# Patient Record
Sex: MALE | Race: WHITE
[De-identification: names, ages, dates, MRNs, and addresses within clinical notes are randomized per-mention and may not be internally consistent; named-entity substitution may affect disease eponyms.]

---

## 2020-03-04 ENCOUNTER — HOSPITAL ENCOUNTER (OUTPATIENT)
Dept: HOSPITAL 61 - ENDOS | Age: 60
End: 2020-03-04
Attending: INTERNAL MEDICINE
Payer: COMMERCIAL

## 2020-03-04 VITALS — DIASTOLIC BLOOD PRESSURE: 84 MMHG | SYSTOLIC BLOOD PRESSURE: 155 MMHG

## 2020-03-04 DIAGNOSIS — E78.5: ICD-10-CM

## 2020-03-04 DIAGNOSIS — D12.5: ICD-10-CM

## 2020-03-04 DIAGNOSIS — Z72.89: ICD-10-CM

## 2020-03-04 DIAGNOSIS — Z88.8: ICD-10-CM

## 2020-03-04 DIAGNOSIS — Z98.52: ICD-10-CM

## 2020-03-04 DIAGNOSIS — K64.0: ICD-10-CM

## 2020-03-04 DIAGNOSIS — E11.9: ICD-10-CM

## 2020-03-04 DIAGNOSIS — I10: ICD-10-CM

## 2020-03-04 DIAGNOSIS — R19.5: Primary | ICD-10-CM

## 2020-03-04 DIAGNOSIS — Z79.84: ICD-10-CM

## 2020-03-04 DIAGNOSIS — J45.909: ICD-10-CM

## 2020-03-04 DIAGNOSIS — F41.9: ICD-10-CM

## 2020-03-04 DIAGNOSIS — F32.9: ICD-10-CM

## 2020-03-04 PROCEDURE — 82962 GLUCOSE BLOOD TEST: CPT

## 2020-03-04 PROCEDURE — 45384 COLONOSCOPY W/LESION REMOVAL: CPT

## 2020-03-04 PROCEDURE — 88305 TISSUE EXAM BY PATHOLOGIST: CPT

## 2020-03-04 PROCEDURE — 45385 COLONOSCOPY W/LESION REMOVAL: CPT

## 2020-03-04 NOTE — CONS
DATE OF CONSULTATION:  03/04/2020



REFERRING PHYSICIAN:  Alisson Correa MD



REASON FOR CONSULTATION:  Positive Cologuard.



HISTORY OF PRESENT ILLNESS:  A 59-year-old  male with past medical

history significant for hyperlipidemia, diabetes, hypertension, seen for a

screening colonoscopy.  Bowel habits are regular without diarrhea or

constipation.  Weight and appetite are stable.  There has been no bleeding. 

Cologuard was positive and is here for further evaluation.



PAST MEDICAL HISTORY:  History of C. diff, anxiety, asthma, depression,

diabetes, hypertension, hyperlipidemia, and sleep apnea.



ALLERGIES:  AMLODIPINE.



MEDICATIONS:  Include amitriptyline, Cymbalta, fenofibrate, insulin, lisinopril,

meloxicam, and metoprolol.



FAMILY AND SOCIAL HISTORY:  Significant for colon polyps in father.



SOCIAL HISTORY:  He is a nonsmoker, social drinker.



PAST SURGICAL HISTORY:  Eye surgery, hernia repair and vasectomy.



REVIEW OF SYSTEMS:  Per records.



PHYSICAL EXAMINATION:

GENERAL:  Reveals a well-nourished, well-developed  male who is alert,

cooperative, in no acute distress.

VITAL SIGNS:  Temperature is 97.8, pulse 100, respirations 20.

LUNGS:  Clear.

CARDIOVASCULAR:  Reveals an S1, S2 without S3, S4 or appreciable murmur.

ABDOMEN:  Soft abdomen, normal bowel sounds, without appreciable

hepatosplenomegaly.

EXTREMITIES:  Reveals no cyanosis, clubbing or edema.



IMPRESSION AND PLAN:  Colorectal screening is warranted at this time.  Risks and

benefits of procedure including risk of hemorrhage and perforation during the

operation have been discussed and the patient is willing to proceed at this

time.

 



______________________________

SHIREEN YOUNGBLOOD MD



DR:  SSP/cheyenne  JOB#:  [id number] / [id number]

DD:  03/04/2020 08:24  DT:  03/04/2020 09:33

## 2020-03-05 NOTE — PATHOLOGY
[facility] Accession Number: [id number]

.                                                                01

Material submitted:                                        .

sigmoid colon - SIGMOID POLYP

.                                                                01

Clinical history:                                          .

CRC screen

.                                                                02

**********************************************************************

Diagnosis:

Colonic mucosa, sigmoid colon polypectomy:

- Tubular adenoma, showing focal high grade dysplasia.  See comment.

(JPM:lester; 03/05/2020)

S  03/05/2020  1329 Local

**********************************************************************

.                                                                02

Comment:

Sections of the sigmoid colon polypectomy reveal a tubular adenoma showing

focal high grade dysplasia.  The focus of high grade dysplasia is well

away from the coagulated base of the polyp.

(JPM:lester; 03/05/2020)

.                                                                02

Electronically signed:                                     .

Pop Nava MD, Pathologist

NPI- [id number]

.                                                                01

Gross description:                                         .

The specimen is received in formalin, labeled "Gus May, sigmoid

polyp".  Received is a segment of red-brown soft tissue measuring 1.2 x

0.9 x 0.6 cm in greatest dimensions.  The surgical margin is inked.  The

specimen is bisected perpendicular to the margin and entirely submitted in

cassette A1.

([facility]; 3/4/2020)

QAC/QAC  03/04/2020  1748 Local

.                                                                02

Pathologist provided ICD-10:

D12.5

.                                                                02

CPT                                                        .

028186

Specimen Comment: A courtesy copy of this report has been sent to [phone], [phone]

Specimen Comment: [id number]

Specimen Comment: Report sent to  / DR HEBERT

***Performed at:  01

   [facility]

   [address], [place], [place]  [id number]

   MD Og Ibrahim MD Phone:  [phone]

***Performed at:  02

   [facility]

   [address], [place]  [id number]

   MD Pop Nava MD Phone:  [phone]